# Patient Record
Sex: MALE | Race: OTHER | HISPANIC OR LATINO | URBAN - METROPOLITAN AREA
[De-identification: names, ages, dates, MRNs, and addresses within clinical notes are randomized per-mention and may not be internally consistent; named-entity substitution may affect disease eponyms.]

---

## 2021-04-27 ENCOUNTER — APPOINTMENT (RX ONLY)
Dept: URBAN - METROPOLITAN AREA CLINIC 37 | Facility: CLINIC | Age: 21
Setting detail: DERMATOLOGY
End: 2021-04-27

## 2021-04-27 ENCOUNTER — APPOINTMENT (RX ONLY)
Dept: URBAN - METROPOLITAN AREA CLINIC 38 | Facility: CLINIC | Age: 21
Setting detail: DERMATOLOGY
End: 2021-04-27

## 2021-04-27 DIAGNOSIS — L30.9 DERMATITIS, UNSPECIFIED: ICD-10-CM

## 2021-04-27 PROCEDURE — ? COUNSELING

## 2021-04-27 PROCEDURE — ? TREATMENT REGIMEN

## 2021-04-27 PROCEDURE — ? PRESCRIPTION

## 2021-04-27 PROCEDURE — 11104 PUNCH BX SKIN SINGLE LESION: CPT

## 2021-04-27 PROCEDURE — ? BIOPSY BY PUNCH METHOD

## 2021-04-27 RX ORDER — CLOBETASOL PROPIONATE 0.5 MG/G
THIN LAYER CREAM TOPICAL BID
Qty: 1 | Refills: 0 | Status: ERX

## 2021-04-27 RX ORDER — DOXYCYCLINE HYCLATE 100 MG/1
1 CAPSULE, GELATIN COATED ORAL BID
Qty: 28 | Refills: 0 | Status: ERX

## 2021-04-27 RX ORDER — DOXYCYCLINE HYCLATE 100 MG/1
1 CAPSULE, GELATIN COATED ORAL BID
Qty: 28 | Refills: 0 | Status: ERX | COMMUNITY
Start: 2021-04-27

## 2021-04-27 RX ORDER — CLOBETASOL PROPIONATE 0.5 MG/G
THIN LAYER CREAM TOPICAL BID
Qty: 1 | Refills: 0 | Status: ERX | COMMUNITY
Start: 2021-04-27

## 2021-04-27 RX ADMIN — DOXYCYCLINE HYCLATE 1: 100 CAPSULE, GELATIN COATED ORAL at 00:00

## 2021-04-27 RX ADMIN — CLOBETASOL PROPIONATE THIN LAYER: 0.5 CREAM TOPICAL at 00:00

## 2021-04-27 ASSESSMENT — LOCATION DETAILED DESCRIPTION DERM
LOCATION DETAILED: LEFT VENTRAL LATERAL DISTAL FOREARM
LOCATION DETAILED: LEFT VENTRAL LATERAL DISTAL FOREARM
LOCATION DETAILED: LEFT VENTRAL PROXIMAL FOREARM
LOCATION DETAILED: LEFT VENTRAL PROXIMAL FOREARM

## 2021-04-27 ASSESSMENT — LOCATION ZONE DERM
LOCATION ZONE: ARM
LOCATION ZONE: ARM

## 2021-04-27 ASSESSMENT — LOCATION SIMPLE DESCRIPTION DERM
LOCATION SIMPLE: LEFT FOREARM
LOCATION SIMPLE: LEFT FOREARM

## 2021-04-27 NOTE — PROCEDURE: BIOPSY BY PUNCH METHOD
Post-Care Instructions: I reviewed with the patient in detail post-care instructions. Patient is to keep the biopsy site dry overnight, and then apply bacitracin twice daily until healed. Patient may apply hydrogen peroxide soaks to remove any crusting.
Billing Type: Third-Party Bill
Detail Level: Detailed
Biopsy Type: H and E
Notification Instructions: Patient will be notified of biopsy results. However, patient instructed to call the office if not contacted within 2 weeks.
Suture Removal: 14 days
Patient Will Remove Sutures At Home?: No
Information: Selecting Yes will display possible errors in your note based on the variables you have selected. This validation is only offered as a suggestion for you. PLEASE NOTE THAT THE VALIDATION TEXT WILL BE REMOVED WHEN YOU FINALIZE YOUR NOTE. IF YOU WANT TO FAX A PRELIMINARY NOTE YOU WILL NEED TO TOGGLE THIS TO 'NO' IF YOU DO NOT WANT IT IN YOUR FAXED NOTE.
Epidermal Sutures: 4-0 Ethilon
X Depth Of Punch In Cm (Optional): 0
Consent: Verbal consent was obtained and risks were reviewed including but not limited to scarring, infection, bleeding, scabbing, incomplete removal, nerve damage and allergy to anesthesia.
Was A Bandage Applied: Yes
Home Suture Removal Text: Patient was provided a home suture removal kit and will remove their sutures at home.  If they have any questions or difficulties they will call the office.
Anesthesia Type: 1% lidocaine with epinephrine
Wound Care: Petrolatum
Dressing: bandage
Anesthesia Volume In Cc: 0.5
Hemostasis: None
Punch Size In Mm: 4

## 2021-04-27 NOTE — PROCEDURE: TREATMENT REGIMEN
Plan: D/w pt the inflammation present and the unlikely possibility of infection. Will have him start Doxycycline, Clobetasol and do punch bx today.
Detail Level: Generalized
Initiate Treatment: Doxycycline twice daily for 2 weeks\\nClobetasol cream twice daily for 2 weeks

## 2021-04-27 NOTE — PROCEDURE: TREATMENT REGIMEN
Initiate Treatment: Doxycycline twice daily for 2 weeks\\nClobetasol cream twice daily for 2 weeks
Plan: D/w pt the inflammation present and the unlikely possibility of infection. Will have him start Doxycycline, Clobetasol and do punch bx today.
Detail Level: Generalized

## 2021-04-27 NOTE — HPI: RASH
What Type Of Note Output Would You Prefer (Optional)?: Bullet Format
Is The Patient Presenting As Previously Scheduled?: Yes
How Severe Is Your Rash?: mild
Is This A New Presentation, Or A Follow-Up?: Rash
Additional History: Pt presents today for rash on the left forearm that is red, itchy, bumpy, and dry/tight skin feeling. Pt states rash to appear right after a tattoo was done. Pt has went to several provider and all medications tried did not help.

## 2021-04-27 NOTE — PROCEDURE: BIOPSY BY PUNCH METHOD
Detail Level: Detailed
Was A Bandage Applied: Yes
Punch Size In Mm: 4
Biopsy Type: H and E
Anesthesia Type: 1% lidocaine with epinephrine
Anesthesia Volume In Cc: 0.5
Additional Anesthesia Volume In Cc (Will Not Render If 0): 0
Hemostasis: None
Epidermal Sutures: 4-0 Ethilon
Wound Care: Petrolatum
Dressing: bandage
Suture Removal: 14 days
Patient Will Remove Sutures At Home?: No
Lab: -[id number]
Lab Facility: [id number]
Consent: Verbal consent was obtained and risks were reviewed including but not limited to scarring, infection, bleeding, scabbing, incomplete removal, nerve damage and allergy to anesthesia.
Post-Care Instructions: I reviewed with the patient in detail post-care instructions. Patient is to keep the biopsy site dry overnight, and then apply bacitracin twice daily until healed. Patient may apply hydrogen peroxide soaks to remove any crusting.
Home Suture Removal Text: Patient was provided a home suture removal kit and will remove their sutures at home.  If they have any questions or difficulties they will call the office.
Notification Instructions: Patient will be notified of biopsy results. However, patient instructed to call the office if not contacted within 2 weeks.
Billing Type: Third-Party Bill
Information: Selecting Yes will display possible errors in your note based on the variables you have selected. This validation is only offered as a suggestion for you. PLEASE NOTE THAT THE VALIDATION TEXT WILL BE REMOVED WHEN YOU FINALIZE YOUR NOTE. IF YOU WANT TO FAX A PRELIMINARY NOTE YOU WILL NEED TO TOGGLE THIS TO 'NO' IF YOU DO NOT WANT IT IN YOUR FAXED NOTE.

## 2021-05-11 ENCOUNTER — APPOINTMENT (RX ONLY)
Dept: URBAN - METROPOLITAN AREA CLINIC 37 | Facility: CLINIC | Age: 21
Setting detail: DERMATOLOGY
End: 2021-05-11

## 2021-05-11 ENCOUNTER — APPOINTMENT (RX ONLY)
Dept: URBAN - METROPOLITAN AREA CLINIC 38 | Facility: CLINIC | Age: 21
Setting detail: DERMATOLOGY
End: 2021-05-11

## 2021-05-11 DIAGNOSIS — Z48.02 ENCOUNTER FOR REMOVAL OF SUTURES: ICD-10-CM

## 2021-05-11 DIAGNOSIS — L20.89 OTHER ATOPIC DERMATITIS: ICD-10-CM

## 2021-05-11 PROBLEM — L20.84 INTRINSIC (ALLERGIC) ECZEMA: Status: ACTIVE | Noted: 2021-05-11

## 2021-05-11 PROCEDURE — ? SUTURE REMOVAL (GLOBAL PERIOD)

## 2021-05-11 PROCEDURE — ? TREATMENT REGIMEN

## 2021-05-11 PROCEDURE — ? COUNSELING

## 2021-05-11 PROCEDURE — ? PHOTO-DOCUMENTATION

## 2021-05-11 PROCEDURE — 99213 OFFICE O/P EST LOW 20 MIN: CPT

## 2021-05-11 ASSESSMENT — LOCATION DETAILED DESCRIPTION DERM
LOCATION DETAILED: LEFT VENTRAL PROXIMAL FOREARM
LOCATION DETAILED: LEFT VENTRAL PROXIMAL FOREARM

## 2021-05-11 ASSESSMENT — LOCATION ZONE DERM
LOCATION ZONE: ARM
LOCATION ZONE: ARM

## 2021-05-11 ASSESSMENT — LOCATION SIMPLE DESCRIPTION DERM
LOCATION SIMPLE: LEFT FOREARM
LOCATION SIMPLE: LEFT FOREARM

## 2021-05-11 NOTE — PROCEDURE: COUNSELING
Detail Level: Detailed
Add 46081 Cpt? (Important Note: In 2017 The Use Of 94893 Is Being Tracked By Cms To Determine Future Global Period Reimbursement For Global Periods): no
Detail Level: Zone

## 2021-05-11 NOTE — PROCEDURE: TREATMENT REGIMEN
Plan: Advised to finish doxycycline, continue Clobetasol for about her 4 weeks, once daily. If rash is still persistent, will do another bx at that time. Do not feel that the biopsy results of spongiotic dermatitis represent entire spectrum of what’s going on.
Detail Level: Generalized

## 2021-05-11 NOTE — PROCEDURE: TREATMENT REGIMEN
Detail Level: Generalized
Plan: Advised to finish doxycycline, continue Clobetasol for about her 4 weeks, once daily. If rash is still persistent, will do another bx at that time. Do not feel that the biopsy results of spongiotic dermatitis represent entire spectrum of what’s going on.

## 2021-05-11 NOTE — PROCEDURE: COUNSELING
Detail Level: Zone
Detail Level: Detailed
Add 94987 Cpt? (Important Note: In 2017 The Use Of 01235 Is Being Tracked By Cms To Determine Future Global Period Reimbursement For Global Periods): no

## 2021-05-11 NOTE — PROCEDURE: SUTURE REMOVAL (GLOBAL PERIOD)
Detail Level: Detailed
Add 34232 Cpt? (Important Note: In 2017 The Use Of 66270 Is Being Tracked By Cms To Determine Future Global Period Reimbursement For Global Periods): no

## 2021-05-11 NOTE — PROCEDURE: SUTURE REMOVAL (GLOBAL PERIOD)
Detail Level: Detailed
Add 40982 Cpt? (Important Note: In 2017 The Use Of 77191 Is Being Tracked By Cms To Determine Future Global Period Reimbursement For Global Periods): no

## 2021-06-03 ENCOUNTER — APPOINTMENT (RX ONLY)
Dept: URBAN - METROPOLITAN AREA CLINIC 38 | Facility: CLINIC | Age: 21
Setting detail: DERMATOLOGY
End: 2021-06-03

## 2021-06-03 ENCOUNTER — APPOINTMENT (RX ONLY)
Dept: URBAN - METROPOLITAN AREA CLINIC 37 | Facility: CLINIC | Age: 21
Setting detail: DERMATOLOGY
End: 2021-06-03

## 2021-06-03 DIAGNOSIS — L20.89 OTHER ATOPIC DERMATITIS: ICD-10-CM

## 2021-06-03 PROBLEM — L30.9 DERMATITIS, UNSPECIFIED: Status: ACTIVE | Noted: 2021-06-03

## 2021-06-03 PROCEDURE — ? BIOPSY BY PUNCH METHOD

## 2021-06-03 PROCEDURE — ? SEPARATE AND IDENTIFIABLE DOCUMENTATION

## 2021-06-03 PROCEDURE — ? PHOTO-DOCUMENTATION

## 2021-06-03 PROCEDURE — ? COUNSELING

## 2021-06-03 PROCEDURE — 11104 PUNCH BX SKIN SINGLE LESION: CPT

## 2021-06-03 PROCEDURE — ? TREATMENT REGIMEN

## 2021-06-03 PROCEDURE — 99213 OFFICE O/P EST LOW 20 MIN: CPT | Mod: 25

## 2021-06-03 ASSESSMENT — LOCATION DETAILED DESCRIPTION DERM
LOCATION DETAILED: LEFT VENTRAL PROXIMAL FOREARM
LOCATION DETAILED: LEFT VENTRAL DISTAL FOREARM
LOCATION DETAILED: LEFT VENTRAL DISTAL FOREARM
LOCATION DETAILED: LEFT VENTRAL PROXIMAL FOREARM

## 2021-06-03 ASSESSMENT — LOCATION SIMPLE DESCRIPTION DERM
LOCATION SIMPLE: LEFT FOREARM
LOCATION SIMPLE: LEFT FOREARM

## 2021-06-03 ASSESSMENT — LOCATION ZONE DERM
LOCATION ZONE: ARM
LOCATION ZONE: ARM

## 2021-06-03 NOTE — PROCEDURE: TREATMENT REGIMEN
Plan: Do not feel that the biopsy results of spongiotic dermatitis represent entire spectrum of what’s going on. Will do another biopsy today. Discussed with pt may have to do injections to the area pending biopsy results.
Detail Level: Generalized

## 2021-06-03 NOTE — PROCEDURE: BIOPSY BY PUNCH METHOD
Detail Level: Detailed
Was A Bandage Applied: Yes
Punch Size In Mm: 2
Biopsy Type: H and E
Anesthesia Type: 1% lidocaine with epinephrine
Anesthesia Volume In Cc: 0.5
Additional Anesthesia Volume In Cc (Will Not Render If 0): 0
Hemostasis: None
Epidermal Sutures: 5-0 Nylon
Wound Care: Petrolatum
Dressing: bandage
Suture Removal: 7 days
Patient Will Remove Sutures At Home?: No
Lab: -[id number]
Lab Facility: [id number]
Consent: Verbal consent was obtained and risks were reviewed including but not limited to scarring, infection, bleeding, scabbing, incomplete removal, nerve damage and allergy to anesthesia.
Post-Care Instructions: I reviewed with the patient in detail post-care instructions. Patient is to keep the biopsy site dry overnight, and then apply petroleum jelly twice daily until healed.
Notification Instructions: Patient will be notified of biopsy results either via the portal, phone, or letter. However, patient instructed to call the office if not contacted within 2 weeks.
Billing Type: Third-Party Bill
Information: Selecting Yes will display possible errors in your note based on the variables you have selected. This validation is only offered as a suggestion for you. PLEASE NOTE THAT THE VALIDATION TEXT WILL BE REMOVED WHEN YOU FINALIZE YOUR NOTE. IF YOU WANT TO FAX A PRELIMINARY NOTE YOU WILL NEED TO TOGGLE THIS TO 'NO' IF YOU DO NOT WANT IT IN YOUR FAXED NOTE.

## 2021-06-03 NOTE — PROCEDURE: BIOPSY BY PUNCH METHOD
Wound Care: Petrolatum
Validate Note Data (See Information Below): Yes
Anesthesia Volume In Cc: 0.5
Biopsy Type: H and E
Render Path Notes In Note?: No
Hemostasis: None
X Size Of Lesion In Cm (Optional): 0
Dressing: bandage
Suture Removal: 7 days
Post-Care Instructions: I reviewed with the patient in detail post-care instructions. Patient is to keep the biopsy site dry overnight, and then apply petroleum jelly twice daily until healed.
Notification Instructions: Patient will be notified of biopsy results either via the portal, phone, or letter. However, patient instructed to call the office if not contacted within 2 weeks.
Billing Type: Third-Party Bill
Epidermal Sutures: 5-0 Nylon
Punch Size In Mm: 2
Consent: Verbal consent was obtained and risks were reviewed including but not limited to scarring, infection, bleeding, scabbing, incomplete removal, nerve damage and allergy to anesthesia.
Information: Selecting Yes will display possible errors in your note based on the variables you have selected. This validation is only offered as a suggestion for you. PLEASE NOTE THAT THE VALIDATION TEXT WILL BE REMOVED WHEN YOU FINALIZE YOUR NOTE. IF YOU WANT TO FAX A PRELIMINARY NOTE YOU WILL NEED TO TOGGLE THIS TO 'NO' IF YOU DO NOT WANT IT IN YOUR FAXED NOTE.
Detail Level: Detailed
Anesthesia Type: 1% lidocaine with epinephrine

## 2021-06-21 ENCOUNTER — RX ONLY (OUTPATIENT)
Age: 21
Setting detail: RX ONLY
End: 2021-06-21

## 2021-06-21 RX ORDER — TERBINAFINE HYDROCHLORIDE 250 MG/1
1 TABLET ORAL QD
Qty: 14 | Refills: 0 | Status: ERX

## 2021-06-21 RX ORDER — TERBINAFINE HYDROCHLORIDE 250 MG/1
1 TABLET ORAL QD
Qty: 14 | Refills: 0 | Status: ERX | COMMUNITY
Start: 2021-06-21

## 2021-06-25 ENCOUNTER — APPOINTMENT (RX ONLY)
Dept: URBAN - METROPOLITAN AREA CLINIC 37 | Facility: CLINIC | Age: 21
Setting detail: DERMATOLOGY
End: 2021-06-25

## 2021-06-25 ENCOUNTER — APPOINTMENT (RX ONLY)
Dept: URBAN - METROPOLITAN AREA CLINIC 38 | Facility: CLINIC | Age: 21
Setting detail: DERMATOLOGY
End: 2021-06-25

## 2021-06-25 DIAGNOSIS — B35.4 TINEA CORPORIS: ICD-10-CM

## 2021-06-25 PROCEDURE — ? PRESCRIPTION

## 2021-06-25 PROCEDURE — 99213 OFFICE O/P EST LOW 20 MIN: CPT

## 2021-06-25 PROCEDURE — ? TREATMENT REGIMEN

## 2021-06-25 PROCEDURE — ? COUNSELING

## 2021-06-25 RX ORDER — KETOCONAZOLE 20 MG/G
1 CREAM TOPICAL BID
Qty: 1 | Refills: 0 | Status: ERX | COMMUNITY
Start: 2021-06-25

## 2021-06-25 RX ORDER — KETOCONAZOLE 20 MG/G
1 CREAM TOPICAL BID
Qty: 1 | Refills: 0 | Status: ERX

## 2021-06-25 RX ADMIN — KETOCONAZOLE 1: 20 CREAM TOPICAL at 00:00

## 2021-06-25 ASSESSMENT — LOCATION DETAILED DESCRIPTION DERM
LOCATION DETAILED: LEFT DISTAL DORSAL FOREARM
LOCATION DETAILED: LEFT DISTAL DORSAL FOREARM

## 2021-06-25 ASSESSMENT — LOCATION SIMPLE DESCRIPTION DERM
LOCATION SIMPLE: LEFT FOREARM
LOCATION SIMPLE: LEFT FOREARM

## 2021-06-25 ASSESSMENT — LOCATION ZONE DERM
LOCATION ZONE: ARM
LOCATION ZONE: ARM

## 2021-06-25 NOTE — PROCEDURE: TREATMENT REGIMEN
Detail Level: Zone
Plan: Patient to call if rash worsens after taking the terbinafine
Initiate Treatment: Ketoconazole cream- twice daily x2 weeks
Continue Regimen: Terbinafine 250mg once daily

## 2021-06-25 NOTE — PROCEDURE: TREATMENT REGIMEN
Continue Regimen: Terbinafine 250mg once daily
Detail Level: Zone
Initiate Treatment: Ketoconazole cream- twice daily x2 weeks
Plan: Patient to call if rash worsens after taking the terbinafine

## 2021-07-06 ENCOUNTER — APPOINTMENT (RX ONLY)
Dept: URBAN - METROPOLITAN AREA CLINIC 37 | Facility: CLINIC | Age: 21
Setting detail: DERMATOLOGY
End: 2021-07-06

## 2021-07-06 ENCOUNTER — APPOINTMENT (RX ONLY)
Dept: URBAN - METROPOLITAN AREA CLINIC 38 | Facility: CLINIC | Age: 21
Setting detail: DERMATOLOGY
End: 2021-07-06

## 2021-07-06 DIAGNOSIS — B35.4 TINEA CORPORIS: ICD-10-CM

## 2021-07-06 PROCEDURE — ? COUNSELING

## 2021-07-06 PROCEDURE — ? PRESCRIPTION

## 2021-07-06 PROCEDURE — 99213 OFFICE O/P EST LOW 20 MIN: CPT

## 2021-07-06 PROCEDURE — ? TREATMENT REGIMEN

## 2021-07-06 RX ORDER — TERBINAFINE HYDROCHLORIDE 250 MG/1
1 TABLET ORAL QD
Qty: 14 | Refills: 0 | Status: ERX

## 2021-07-06 ASSESSMENT — LOCATION DETAILED DESCRIPTION DERM
LOCATION DETAILED: LEFT DISTAL DORSAL FOREARM
LOCATION DETAILED: LEFT DISTAL DORSAL FOREARM

## 2021-07-06 ASSESSMENT — LOCATION ZONE DERM
LOCATION ZONE: ARM
LOCATION ZONE: ARM

## 2021-07-06 ASSESSMENT — LOCATION SIMPLE DESCRIPTION DERM
LOCATION SIMPLE: LEFT FOREARM
LOCATION SIMPLE: LEFT FOREARM

## 2021-07-06 NOTE — PROCEDURE: TREATMENT REGIMEN
Plan: I examined him today.  Certainly improved, but not out of the woods so to speak yet.  He has some new surrounding erythematous scaly papules.  The original area is much flatter, but still some erythema and scale remaining.  He said he tolerated the oral medicine well, so will have him continue for another 2 weeks.
Detail Level: Zone
Continue Regimen: Ketoconazole cream- BID\\nLamisil 250mg - one tablet once daily x 14 days

## 2021-07-06 NOTE — PROCEDURE: TREATMENT REGIMEN
Detail Level: Zone
Plan: I examined him today.  Certainly improved, but not out of the woods so to speak yet.  He has some new surrounding erythematous scaly papules.  The original area is much flatter, but still some erythema and scale remaining.  He said he tolerated the oral medicine well, so will have him continue for another 2 weeks.
Continue Regimen: Ketoconazole cream- BID\\nLamisil 250mg - one tablet once daily x 14 days

## 2021-07-23 ENCOUNTER — APPOINTMENT (RX ONLY)
Dept: URBAN - METROPOLITAN AREA CLINIC 37 | Facility: CLINIC | Age: 21
Setting detail: DERMATOLOGY
End: 2021-07-23

## 2021-07-23 ENCOUNTER — APPOINTMENT (RX ONLY)
Dept: URBAN - METROPOLITAN AREA CLINIC 38 | Facility: CLINIC | Age: 21
Setting detail: DERMATOLOGY
End: 2021-07-23

## 2021-07-23 DIAGNOSIS — B35.4 TINEA CORPORIS: ICD-10-CM | Status: INADEQUATELY CONTROLLED

## 2021-07-23 PROBLEM — L08.9 LOCAL INFECTION OF THE SKIN AND SUBCUTANEOUS TISSUE, UNSPECIFIED: Status: ACTIVE | Noted: 2021-07-23

## 2021-07-23 PROCEDURE — ? TREATMENT REGIMEN

## 2021-07-23 PROCEDURE — 99213 OFFICE O/P EST LOW 20 MIN: CPT

## 2021-07-23 PROCEDURE — ? ORDER TESTS

## 2021-07-23 PROCEDURE — ? COUNSELING

## 2021-07-23 ASSESSMENT — LOCATION DETAILED DESCRIPTION DERM
LOCATION DETAILED: LEFT VENTRAL PROXIMAL FOREARM
LOCATION DETAILED: LEFT VENTRAL PROXIMAL FOREARM

## 2021-07-23 ASSESSMENT — LOCATION SIMPLE DESCRIPTION DERM
LOCATION SIMPLE: LEFT FOREARM
LOCATION SIMPLE: LEFT FOREARM

## 2021-07-23 ASSESSMENT — LOCATION ZONE DERM
LOCATION ZONE: ARM
LOCATION ZONE: ARM

## 2021-07-23 NOTE — PROCEDURE: ORDER TESTS
Billing Type: Third-Party Bill
Expected Date Of Service: 07/23/2021
Lab Facility: [id number]
Performing Laboratory: [id number]
Bill For Surgical Tray: no

## 2021-07-23 NOTE — PROCEDURE: TREATMENT REGIMEN
Detail Level: Zone
Samples Given: Exelderm apply all throughout the fungus twice daily
Plan: D/w patient that at times some may be sensitive/not respond to ketoconazole so I will provide samples for him to try given he previously had this biopsied proven tinea.  However, if rash spread or worsens may d/c use of all topical therapy and repeat a biopsy with two punches if necessary

## 2021-07-23 NOTE — PROCEDURE: TREATMENT REGIMEN
Detail Level: Zone
Plan: D/w patient that at times some may be sensitive/not respond to ketoconazole so I will provide samples for him to try given he previously had this biopsied proven tinea.  However, if rash spread or worsens may d/c use of all topical therapy and repeat a biopsy with two punches if necessary
Samples Given: Exelderm apply all throughout the fungus twice daily

## 2021-07-26 ENCOUNTER — RX ONLY (OUTPATIENT)
Age: 21
Setting detail: RX ONLY
End: 2021-07-26

## 2021-07-26 RX ORDER — TERBINAFINE HYDROCHLORIDE 250 MG/1
TABLET ORAL
Qty: 30 | Refills: 0 | Status: ERX

## 2021-07-26 RX ORDER — SULCONAZOLE NITRATE 10 MG/G
CREAM TOPICAL
Qty: 1 | Refills: 0 | Status: ERX

## 2021-07-26 RX ORDER — SULCONAZOLE NITRATE 10 MG/G
CREAM TOPICAL
Qty: 1 | Refills: 0 | Status: ERX | COMMUNITY
Start: 2021-07-26

## 2021-07-26 RX ORDER — DOXYCYCLINE HYCLATE 100 MG/1
CAPSULE, GELATIN COATED ORAL
Qty: 42 | Refills: 0 | Status: ERX

## 2021-07-26 RX ORDER — DOXYCYCLINE HYCLATE 100 MG/1
CAPSULE, GELATIN COATED ORAL
Qty: 42 | Refills: 0 | Status: ERX | COMMUNITY
Start: 2021-07-26

## 2021-08-30 ENCOUNTER — APPOINTMENT (RX ONLY)
Dept: URBAN - METROPOLITAN AREA CLINIC 37 | Facility: CLINIC | Age: 21
Setting detail: DERMATOLOGY
End: 2021-08-30

## 2021-08-30 ENCOUNTER — RX ONLY (OUTPATIENT)
Age: 21
Setting detail: RX ONLY
End: 2021-08-30

## 2021-08-30 ENCOUNTER — APPOINTMENT (RX ONLY)
Dept: URBAN - METROPOLITAN AREA CLINIC 38 | Facility: CLINIC | Age: 21
Setting detail: DERMATOLOGY
End: 2021-08-30

## 2021-08-30 DIAGNOSIS — B35.4 TINEA CORPORIS: ICD-10-CM | Status: IMPROVED

## 2021-08-30 PROCEDURE — ? VENIPUNCTURE

## 2021-08-30 PROCEDURE — ? COUNSELING

## 2021-08-30 PROCEDURE — 99213 OFFICE O/P EST LOW 20 MIN: CPT | Mod: 25

## 2021-08-30 PROCEDURE — ? TREATMENT REGIMEN

## 2021-08-30 PROCEDURE — ? ORDER TESTS

## 2021-08-30 PROCEDURE — 36415 COLL VENOUS BLD VENIPUNCTURE: CPT

## 2021-08-30 RX ORDER — TERBINAFINE HYDROCHLORIDE 250 MG/1
1 TABLET ORAL QD
Qty: 30 | Refills: 0 | Status: ERX | COMMUNITY
Start: 2021-08-30

## 2021-08-30 RX ORDER — TERBINAFINE HYDROCHLORIDE 250 MG/1
1 TABLET ORAL QD
Qty: 30 | Refills: 0 | Status: ERX

## 2021-08-30 ASSESSMENT — LOCATION DETAILED DESCRIPTION DERM
LOCATION DETAILED: LEFT VENTRAL PROXIMAL FOREARM
LOCATION DETAILED: LEFT ANTECUBITAL SKIN
LOCATION DETAILED: LEFT VENTRAL PROXIMAL FOREARM
LOCATION DETAILED: LEFT ANTECUBITAL SKIN

## 2021-08-30 ASSESSMENT — LOCATION SIMPLE DESCRIPTION DERM
LOCATION SIMPLE: LEFT FOREARM
LOCATION SIMPLE: LEFT ELBOW
LOCATION SIMPLE: LEFT FOREARM
LOCATION SIMPLE: LEFT ELBOW

## 2021-08-30 ASSESSMENT — LOCATION ZONE DERM
LOCATION ZONE: ARM
LOCATION ZONE: ARM

## 2021-08-30 NOTE — PROCEDURE: ORDER TESTS
Lab Facility: [id number]
Performing Laboratory: [id number]
Bill For Surgical Tray: no
Billing Type: Third-Party Bill
Expected Date Of Service: 08/30/2021

## 2021-08-30 NOTE — PROCEDURE: TREATMENT REGIMEN
Plan: \\n\\nSignificantly better - almost 80% but still some residual active areas that given his long recurrent course with this I'd like to see gone before d/c all PO/topical therapy
Continue Regimen: Terbinafine x 1 month
Detail Level: Zone

## 2021-08-30 NOTE — PROCEDURE: TREATMENT REGIMEN
Detail Level: Zone
Plan: \\n\\nSignificantly better - almost 80% but still some residual active areas that given his long recurrent course with this I'd like to see gone before d/c all PO/topical therapy
Continue Regimen: Terbinafine x 1 month

## 2021-08-30 NOTE — PROCEDURE: VENIPUNCTURE
Bill [id number] For Specimen Handling/Conveyance To Laboratory?: no
Venipuncture Paragraph: An alcohol pad was applied to the venipuncture site. Venipuncture was performed using a butterfly needle. Pressure and a bandaid was applied to the site. No complications were noted.
Number Of Tubes Drawn: 2
Detail Level: None

## 2021-09-08 ENCOUNTER — APPOINTMENT (RX ONLY)
Dept: URBAN - METROPOLITAN AREA CLINIC 37 | Facility: CLINIC | Age: 21
Setting detail: DERMATOLOGY
End: 2021-09-08

## 2021-09-08 ENCOUNTER — APPOINTMENT (RX ONLY)
Dept: URBAN - METROPOLITAN AREA CLINIC 38 | Facility: CLINIC | Age: 21
Setting detail: DERMATOLOGY
End: 2021-09-08

## 2021-09-08 DIAGNOSIS — B35.4 TINEA CORPORIS: ICD-10-CM

## 2021-09-08 PROCEDURE — ? VENIPUNCTURE

## 2021-09-08 PROCEDURE — ? ORDER TESTS

## 2021-09-08 PROCEDURE — 36415 COLL VENOUS BLD VENIPUNCTURE: CPT

## 2021-09-08 PROCEDURE — ? COUNSELING

## 2021-09-08 PROCEDURE — 99213 OFFICE O/P EST LOW 20 MIN: CPT | Mod: 25

## 2021-09-08 ASSESSMENT — LOCATION ZONE DERM
LOCATION ZONE: ARM
LOCATION ZONE: ARM

## 2021-09-08 ASSESSMENT — LOCATION SIMPLE DESCRIPTION DERM
LOCATION SIMPLE: RIGHT ELBOW
LOCATION SIMPLE: RIGHT ELBOW

## 2021-09-08 ASSESSMENT — LOCATION DETAILED DESCRIPTION DERM
LOCATION DETAILED: RIGHT ANTECUBITAL SKIN
LOCATION DETAILED: RIGHT ANTECUBITAL SKIN

## 2021-09-08 NOTE — PROCEDURE: ORDER TESTS
Billing Type: Third-Party Bill
Bill For Surgical Tray: no
Performing Laboratory: [id number]
Expected Date Of Service: 09/08/2021
Lab Facility: [id number]

## 2021-09-08 NOTE — PROCEDURE: VENIPUNCTURE
Detail Level: None
Bill [id number] For Specimen Handling/Conveyance To Laboratory?: no
Venipuncture Paragraph: An alcohol pad was applied to the venipuncture site. Venipuncture was performed using a butterfly needle. Pressure and a bandaid was applied to the site. No complications were noted.
Number Of Tubes Drawn: 2

## 2021-09-08 NOTE — HPI: TESTING (BLOOD WORK)
Are You Fasting?: Yes
Additional History: His last triglycerides were >300. This is a fasting repeat

## 2021-09-21 ENCOUNTER — APPOINTMENT (RX ONLY)
Dept: URBAN - METROPOLITAN AREA CLINIC 38 | Facility: CLINIC | Age: 21
Setting detail: DERMATOLOGY
End: 2021-09-21

## 2021-09-21 ENCOUNTER — APPOINTMENT (RX ONLY)
Dept: URBAN - METROPOLITAN AREA CLINIC 37 | Facility: CLINIC | Age: 21
Setting detail: DERMATOLOGY
End: 2021-09-21

## 2021-09-21 DIAGNOSIS — L259 CONTACT DERMATITIS AND OTHER ECZEMA, UNSPECIFIED CAUSE: ICD-10-CM

## 2021-09-21 PROBLEM — L30.8 OTHER SPECIFIED DERMATITIS: Status: ACTIVE | Noted: 2021-09-21

## 2021-09-21 PROCEDURE — 99213 OFFICE O/P EST LOW 20 MIN: CPT

## 2021-09-21 PROCEDURE — ? PRESCRIPTION

## 2021-09-21 PROCEDURE — ? TREATMENT REGIMEN

## 2021-09-21 PROCEDURE — ? COUNSELING

## 2021-09-21 RX ORDER — MOMETASONE FUROATE 1 MG/G
1 CREAM TOPICAL BID
Qty: 45 | Refills: 0 | Status: ERX | COMMUNITY
Start: 2021-09-21

## 2021-09-21 RX ADMIN — MOMETASONE FUROATE 1: 1 CREAM TOPICAL at 00:00

## 2021-09-21 ASSESSMENT — LOCATION DETAILED DESCRIPTION DERM
LOCATION DETAILED: LEFT DISTAL DORSAL FOREARM
LOCATION DETAILED: LEFT DISTAL DORSAL FOREARM

## 2021-09-21 ASSESSMENT — LOCATION SIMPLE DESCRIPTION DERM
LOCATION SIMPLE: LEFT FOREARM
LOCATION SIMPLE: LEFT FOREARM

## 2021-09-21 ASSESSMENT — LOCATION ZONE DERM
LOCATION ZONE: ARM
LOCATION ZONE: ARM

## 2021-09-21 NOTE — HPI: RASH
What Type Of Note Output Would You Prefer (Optional)?: Standard Output
Is The Patient Presenting As Previously Scheduled?: Yes
How Severe Is Your Rash?: moderate
Is This A New Presentation, Or A Follow-Up?: Follow Up Rash
Additional History: Patient had punch biopsies done and give terbinafine  for a month  and he has about 5 left to complete dosage. He also was given doxycycline to take and he has completed the dosing of this medication. Patient states he does flare from time to time still but all things considered he has improved. He does flare up and it itches.  He noticed improvement while on Doxycycline

## 2021-09-21 NOTE — PROCEDURE: TREATMENT REGIMEN
Discontinue Regimen: Terbinafine and exelderm cream
Plan: Dr Jarrell came in and evaluated the patient and states  at this point the fungal component of the rash has improved but now he has a residual eczema to address.  We discussed treating with mid potency steroid for 2-3 weeks twice a day then follow up for a recheck
Initiate Treatment: Mometasone cream bid x2-3 week
Detail Level: Zone

## 2021-09-21 NOTE — PROCEDURE: TREATMENT REGIMEN
Discontinue Regimen: Terbinafine and exelderm cream
Initiate Treatment: Mometasone cream bid x2-3 week
Detail Level: Zone
Plan: Dr Palacios came in and evaluated the patient and states  at this point the fungal component of the rash has improved but now he has a residual eczema to address.  We discussed treating with mid potency steroid for 2-3 weeks twice a day then follow up for a recheck

## 2021-10-12 ENCOUNTER — APPOINTMENT (RX ONLY)
Dept: URBAN - METROPOLITAN AREA CLINIC 41 | Facility: CLINIC | Age: 21
Setting detail: DERMATOLOGY
End: 2021-10-12

## 2021-10-12 DIAGNOSIS — L259 CONTACT DERMATITIS AND OTHER ECZEMA, UNSPECIFIED CAUSE: ICD-10-CM

## 2021-10-12 PROBLEM — L30.8 OTHER SPECIFIED DERMATITIS: Status: ACTIVE | Noted: 2021-10-12

## 2021-10-12 PROCEDURE — ? TREATMENT REGIMEN

## 2021-10-12 PROCEDURE — 99213 OFFICE O/P EST LOW 20 MIN: CPT

## 2021-10-12 PROCEDURE — ? COUNSELING

## 2021-10-12 ASSESSMENT — LOCATION SIMPLE DESCRIPTION DERM: LOCATION SIMPLE: LEFT FOREARM

## 2021-10-12 ASSESSMENT — LOCATION ZONE DERM: LOCATION ZONE: ARM

## 2021-10-12 ASSESSMENT — LOCATION DETAILED DESCRIPTION DERM: LOCATION DETAILED: LEFT VENTRAL PROXIMAL FOREARM

## 2021-10-12 NOTE — PROCEDURE: TREATMENT REGIMEN
Discontinue Regimen: Mometasone cream bid x2-3 week
Detail Level: Zone
Plan: Go to regular skin regimen as of today. Advised patient not to shave his arm at least for another month or so.

## 2024-02-23 ENCOUNTER — APPOINTMENT (RX ONLY)
Dept: URBAN - METROPOLITAN AREA CLINIC 164 | Facility: CLINIC | Age: 24
Setting detail: DERMATOLOGY
End: 2024-02-23

## 2024-02-23 DIAGNOSIS — Z41.9 ENCOUNTER FOR PROCEDURE FOR PURPOSES OTHER THAN REMEDYING HEALTH STATE, UNSPECIFIED: ICD-10-CM

## 2024-04-12 ENCOUNTER — APPOINTMENT (RX ONLY)
Dept: URBAN - METROPOLITAN AREA CLINIC 164 | Facility: CLINIC | Age: 24
Setting detail: DERMATOLOGY
End: 2024-04-12

## 2024-04-12 DIAGNOSIS — Z41.9 ENCOUNTER FOR PROCEDURE FOR PURPOSES OTHER THAN REMEDYING HEALTH STATE, UNSPECIFIED: ICD-10-CM

## 2024-04-12 PROCEDURE — ? MICRONEEDLING

## 2024-04-12 ASSESSMENT — LOCATION DETAILED DESCRIPTION DERM: LOCATION DETAILED: NASAL DORSUM

## 2024-04-12 ASSESSMENT — LOCATION ZONE DERM: LOCATION ZONE: NOSE

## 2024-04-12 ASSESSMENT — LOCATION SIMPLE DESCRIPTION DERM: LOCATION SIMPLE: NOSE

## 2024-04-12 NOTE — PROCEDURE: MICRONEEDLING
Depth In Mm (Location #5): 0.1
Consent: Written consent obtained, risks reviewed including but not limited to pain, scarring, infection and incomplete improvement.  Patient understands the procedure is cosmetic in nature and will require out of pocket payment.
How Many Cc Of Bacteriostatic 0.9% Saline Were Added?: 0
Depth In Mm (Location #2): 0.8
Topical Anesthesia?: 10% benzocaine, 3% lidocaine, 2% tetracaine, 0.01% phenylephrine
Price (Use Numbers Only, No Special Characters Or $): 150
Treatment Number (Optional): 1
Location #1: nose only
Detail Level: Zone
Depth In Mm (Location #1): 1.8
Post-Care Instructions: After the procedure, take precautions agains sun exposure. Do not apply sunscreen for 12 hours after the procedure. Do not apply make-up for 12 hours after the procedure. Avoid alcohol based toners for 10-14 days. After 2-3 days patients can return to their regular skin regimen.

## 2025-01-27 ENCOUNTER — APPOINTMENT (OUTPATIENT)
Dept: URBAN - METROPOLITAN AREA CLINIC 164 | Facility: CLINIC | Age: 25
Setting detail: DERMATOLOGY
End: 2025-01-27

## 2025-01-27 DIAGNOSIS — Z41.9 ENCOUNTER FOR PROCEDURE FOR PURPOSES OTHER THAN REMEDYING HEALTH STATE, UNSPECIFIED: ICD-10-CM

## 2025-01-27 PROCEDURE — ? LASER COSMETIC

## 2025-01-27 ASSESSMENT — LOCATION ZONE DERM: LOCATION ZONE: LEG

## 2025-01-27 ASSESSMENT — LOCATION SIMPLE DESCRIPTION DERM
LOCATION SIMPLE: RIGHT THIGH
LOCATION SIMPLE: LEFT THIGH

## 2025-01-27 ASSESSMENT — LOCATION DETAILED DESCRIPTION DERM
LOCATION DETAILED: LEFT ANTERIOR PROXIMAL THIGH
LOCATION DETAILED: RIGHT ANTERIOR MEDIAL PROXIMAL THIGH

## 2025-01-27 NOTE — PROCEDURE: LASER COSMETIC
Price (Use Numbers Only, No Special Characters Or $): 500
Laser Type: Lumenis Quattro
Treatment Number: 1/6 inner thighs
Wavelength (Include Units): med
Anesthesia Type: 1% lidocaine with epinephrine
Anesthesia Volume In Cc: 0
Eye Protection: opaque goggles
Pre-Procedure Care: Prior to the procedure the patient and all present had protective eyewear in place and a warning sign was placed on the door.
Consent: Prior to the procedure consent obtained, risks reviewed including but not limited to crusting, scabbing, blistering, scarring, darker or lighter pigmentary change, incidental hair removal, bruising, and/or incomplete removal.
Indication: excess hair growth
Detail Level: Zone
Fluence (Include Units): 7.5
Pulse Energy (Include Units): custom
Render Post-Care In The Note: No
End-Point And Post-Procedure Care: The procedure continued until mild purpura was noted.  Immediately following the procedure, cold compress applied. Post care reviewed with patient.
Post-Care Instructions: I reviewed with the patient in detail post-care instructions. Patient should stay away from the sun and wear sun protection until treated areas are fully healed.
Clinical Endpoint: erythema

## 2025-03-20 ENCOUNTER — APPOINTMENT (OUTPATIENT)
Dept: URBAN - METROPOLITAN AREA CLINIC 164 | Facility: CLINIC | Age: 25
Setting detail: DERMATOLOGY
End: 2025-03-20

## 2025-03-20 DIAGNOSIS — Z41.9 ENCOUNTER FOR PROCEDURE FOR PURPOSES OTHER THAN REMEDYING HEALTH STATE, UNSPECIFIED: ICD-10-CM

## 2025-03-20 PROCEDURE — ? LASER COSMETIC

## 2025-03-20 ASSESSMENT — LOCATION DETAILED DESCRIPTION DERM
LOCATION DETAILED: RIGHT ANTERIOR PROXIMAL THIGH
LOCATION DETAILED: LEFT ANTERIOR PROXIMAL THIGH

## 2025-03-20 ASSESSMENT — LOCATION SIMPLE DESCRIPTION DERM
LOCATION SIMPLE: LEFT THIGH
LOCATION SIMPLE: RIGHT THIGH

## 2025-03-20 ASSESSMENT — LOCATION ZONE DERM: LOCATION ZONE: LEG

## 2025-03-20 NOTE — PROCEDURE: LASER COSMETIC
Indication: excess hair growth
Eye Protection: opaque goggles
End-Point And Post-Procedure Care: The procedure continued until mild purpura was noted.  Immediately following the procedure, cold compress applied. Post care reviewed with patient.
Laser Type: Lumenis Quattro
Anesthesia Type: 1% lidocaine with epinephrine
Pre-Procedure Care: Prior to the procedure the patient and all present had protective eyewear in place and a warning sign was placed on the door.
Anesthesia Volume In Cc: 0
Post-Care Instructions: I reviewed with the patient in detail post-care instructions. Patient should stay away from the sun and wear sun protection until treated areas are fully healed.
Clinical Endpoint: erythema
Detail Level: Zone
Consent: Prior to the procedure consent obtained, risks reviewed including but not limited to crusting, scabbing, blistering, scarring, darker or lighter pigmentary change, incidental hair removal, bruising, and/or incomplete removal.
Fluence (Include Units): 7.5
Treatment Number: 2/6 inner thighs
Render Post-Care In The Note: No
Pulse Energy (Include Units): custom
Wavelength (Include Units): med

## 2025-04-24 ENCOUNTER — APPOINTMENT (OUTPATIENT)
Dept: URBAN - METROPOLITAN AREA CLINIC 164 | Facility: CLINIC | Age: 25
Setting detail: DERMATOLOGY
End: 2025-04-24

## 2025-04-24 DIAGNOSIS — Z41.9 ENCOUNTER FOR PROCEDURE FOR PURPOSES OTHER THAN REMEDYING HEALTH STATE, UNSPECIFIED: ICD-10-CM

## 2025-04-24 PROCEDURE — ? LASER COSMETIC

## 2025-04-24 ASSESSMENT — LOCATION DETAILED DESCRIPTION DERM
LOCATION DETAILED: RIGHT ANTERIOR PROXIMAL THIGH
LOCATION DETAILED: LEFT ANTERIOR PROXIMAL THIGH

## 2025-04-24 ASSESSMENT — LOCATION SIMPLE DESCRIPTION DERM
LOCATION SIMPLE: RIGHT THIGH
LOCATION SIMPLE: LEFT THIGH

## 2025-04-24 ASSESSMENT — LOCATION ZONE DERM: LOCATION ZONE: LEG

## 2025-04-24 NOTE — PROCEDURE: LASER COSMETIC
Fluence (Include Units): 9.0
Post-Care Instructions: I reviewed with the patient in detail post-care instructions. Patient should stay away from the sun and wear sun protection until treated areas are fully healed.
Cooling: chill tip
End-Point And Post-Procedure Care: The procedure continued until mild purpura was noted.  Immediately following the procedure, cold compress applied. Post care reviewed with patient.
Render Post-Care In The Note: No
Eye Protection: opaque goggles
# Of Treatments In Package: 0
Laser Type: Lumenis Quattro
Indication: excess hair growth
Pulse Energy (Include Units): custom
Detail Level: Zone
Anesthesia Type: 1% lidocaine with epinephrine
Treatment Number: 3/6 inner thighs
Consent: Prior to the procedure consent obtained, risks reviewed including but not limited to crusting, scabbing, blistering, scarring, darker or lighter pigmentary change, incidental hair removal, bruising, and/or incomplete removal.
Wavelength (Include Units): med
Pre-Procedure Care: Prior to the procedure the patient and all present had protective eyewear in place and a warning sign was placed on the door.

## 2025-06-10 ENCOUNTER — APPOINTMENT (OUTPATIENT)
Dept: URBAN - METROPOLITAN AREA CLINIC 164 | Facility: CLINIC | Age: 25
Setting detail: DERMATOLOGY
End: 2025-06-10

## 2025-06-10 DIAGNOSIS — Z41.9 ENCOUNTER FOR PROCEDURE FOR PURPOSES OTHER THAN REMEDYING HEALTH STATE, UNSPECIFIED: ICD-10-CM

## 2025-06-10 PROCEDURE — ? LASER COSMETIC

## 2025-06-10 ASSESSMENT — LOCATION SIMPLE DESCRIPTION DERM
LOCATION SIMPLE: RIGHT THIGH
LOCATION SIMPLE: LEFT THIGH

## 2025-06-10 ASSESSMENT — LOCATION ZONE DERM: LOCATION ZONE: LEG

## 2025-06-10 ASSESSMENT — LOCATION DETAILED DESCRIPTION DERM
LOCATION DETAILED: LEFT ANTERIOR PROXIMAL THIGH
LOCATION DETAILED: RIGHT ANTERIOR PROXIMAL THIGH

## 2025-06-10 NOTE — PROCEDURE: LASER COSMETIC
Treatment Number: 4/6 inner thighs
End-Point And Post-Procedure Care: The procedure continued until mild purpura was noted.  Immediately following the procedure, cold compress applied. Post care reviewed with patient.
Pulse Energy (Include Units): custom
Clinical Endpoint: erythema
Render Post-Care In The Note: No
Laser Type: Lumenis Quattro
Detail Level: Zone
Eye Protection: opaque goggles
Wavelength (Include Units): med
Indication: excess hair growth
Post-Care Instructions: I reviewed with the patient in detail post-care instructions. Patient should stay away from the sun and wear sun protection until treated areas are fully healed.
Pre-Procedure Care: Prior to the procedure the patient and all present had protective eyewear in place and a warning sign was placed on the door.
Consent: Prior to the procedure consent obtained, risks reviewed including but not limited to crusting, scabbing, blistering, scarring, darker or lighter pigmentary change, incidental hair removal, bruising, and/or incomplete removal.
Anesthesia Type: 1% lidocaine with epinephrine
Anesthesia Volume In Cc: 0
Fluence (Include Units): 9.5

## 2025-07-22 ENCOUNTER — APPOINTMENT (OUTPATIENT)
Dept: URBAN - METROPOLITAN AREA CLINIC 164 | Facility: CLINIC | Age: 25
Setting detail: DERMATOLOGY
End: 2025-07-22

## 2025-07-22 DIAGNOSIS — Z41.9 ENCOUNTER FOR PROCEDURE FOR PURPOSES OTHER THAN REMEDYING HEALTH STATE, UNSPECIFIED: ICD-10-CM

## 2025-07-22 PROCEDURE — ? LASER COSMETIC

## 2025-07-22 ASSESSMENT — LOCATION ZONE DERM: LOCATION ZONE: LEG

## 2025-07-22 ASSESSMENT — LOCATION SIMPLE DESCRIPTION DERM
LOCATION SIMPLE: LEFT THIGH
LOCATION SIMPLE: RIGHT THIGH

## 2025-07-22 ASSESSMENT — LOCATION DETAILED DESCRIPTION DERM
LOCATION DETAILED: LEFT ANTERIOR PROXIMAL THIGH
LOCATION DETAILED: RIGHT ANTERIOR PROXIMAL THIGH

## 2025-07-22 NOTE — PROCEDURE: LASER COSMETIC
Indication: excess hair growth
Wavelength (Include Units): med
End-Point And Post-Procedure Care: The procedure continued until mild purpura was noted.  Immediately following the procedure, cold compress applied. Post care reviewed with patient.
Fluence (Include Units): 10.00
Render Post-Care In The Note: No
Post-Care Instructions: I reviewed with the patient in detail post-care instructions. Patient should stay away from the sun and wear sun protection until treated areas are fully healed.
Clinical Endpoint: erythema
# Of Treatments In Package: 0
Consent: Prior to the procedure consent obtained, risks reviewed including but not limited to crusting, scabbing, blistering, scarring, darker or lighter pigmentary change, incidental hair removal, bruising, and/or incomplete removal.
Pulse Energy (Include Units): custom
Laser Type: Lumenis Quattro
Anesthesia Type: 1% lidocaine with epinephrine
Pre-Procedure Care: Prior to the procedure the patient and all present had protective eyewear in place and a warning sign was placed on the door.
Detail Level: Zone
Eye Protection: opaque goggles
Treatment Number: 5/6 inner thighs